# Patient Record
Sex: FEMALE | URBAN - METROPOLITAN AREA
[De-identification: names, ages, dates, MRNs, and addresses within clinical notes are randomized per-mention and may not be internally consistent; named-entity substitution may affect disease eponyms.]

---

## 2023-11-14 ENCOUNTER — APPOINTMENT (OUTPATIENT)
Dept: URBAN - METROPOLITAN AREA CLINIC 204 | Age: 38
Setting detail: DERMATOLOGY
End: 2023-11-15

## 2023-11-14 DIAGNOSIS — L82.1 OTHER SEBORRHEIC KERATOSIS: ICD-10-CM

## 2023-11-14 DIAGNOSIS — L57.8 OTHER SKIN CHANGES DUE TO CHRONIC EXPOSURE TO NONIONIZING RADIATION: ICD-10-CM

## 2023-11-14 PROCEDURE — OTHER ADDITIONAL NOTES: OTHER

## 2023-11-14 PROCEDURE — OTHER COUNSELING: OTHER

## 2023-11-14 PROCEDURE — OTHER MIPS QUALITY: OTHER

## 2023-11-14 PROCEDURE — 99203 OFFICE O/P NEW LOW 30 MIN: CPT

## 2023-11-14 ASSESSMENT — LOCATION DETAILED DESCRIPTION DERM
LOCATION DETAILED: LEFT INSTEP
LOCATION DETAILED: SUPERIOR MID FOREHEAD

## 2023-11-14 ASSESSMENT — LOCATION SIMPLE DESCRIPTION DERM
LOCATION SIMPLE: LEFT PLANTAR SURFACE
LOCATION SIMPLE: SUPERIOR FOREHEAD

## 2023-11-14 ASSESSMENT — LOCATION ZONE DERM
LOCATION ZONE: FEET
LOCATION ZONE: FACE

## 2023-11-14 NOTE — PROCEDURE: MIPS QUALITY
Quality 110: Preventive Care And Screening: Influenza Immunization: Influenza Immunization previously received during influenza season
Quality 47: Advance Care Plan: Advance Care Planning discussed and documented; advance care plan or surrogate decision maker documented in the medical record.
Quality 226: Preventive Care And Screening: Tobacco Use: Screening And Cessation Intervention: Patient screened for tobacco use, is a smoker AND received Cessation Counseling within measurement period or in the six months prior to the measurement period
Detail Level: Detailed
Quality 130: Documentation Of Current Medications In The Medical Record: Current Medications Documented

## 2023-11-14 NOTE — PROCEDURE: ADDITIONAL NOTES
Additional Notes: 0.7cm fleshy papule - if patient decides to have it removed in future.
Detail Level: Simple
Render Risk Assessment In Note?: no

## 2024-02-14 ENCOUNTER — OFFICE VISIT (OUTPATIENT)
Dept: URGENT CARE | Facility: CLINIC | Age: 39
End: 2024-02-14
Payer: MEDICAID

## 2024-02-14 VITALS
HEART RATE: 86 BPM | WEIGHT: 198 LBS | HEIGHT: 61 IN | DIASTOLIC BLOOD PRESSURE: 71 MMHG | SYSTOLIC BLOOD PRESSURE: 107 MMHG | RESPIRATION RATE: 16 BRPM | BODY MASS INDEX: 37.38 KG/M2 | TEMPERATURE: 97.5 F | OXYGEN SATURATION: 98 %

## 2024-02-14 DIAGNOSIS — K04.7 DENTAL ABSCESS: Primary | ICD-10-CM

## 2024-02-14 PROCEDURE — 99212 OFFICE O/P EST SF 10 MIN: CPT | Mod: 25

## 2024-02-14 RX ORDER — INDOMETHACIN 50 MG/1
50 CAPSULE ORAL
COMMUNITY

## 2024-02-14 RX ORDER — PRAZOSIN HYDROCHLORIDE 1 MG/1
1 CAPSULE ORAL
COMMUNITY

## 2024-02-14 RX ORDER — AMOXICILLIN AND CLAVULANATE POTASSIUM 875; 125 MG/1; MG/1
1 TABLET, FILM COATED ORAL 2 TIMES DAILY
Qty: 14 TABLET | Refills: 0 | Status: SHIPPED | OUTPATIENT
Start: 2024-02-14 | End: 2024-02-21

## 2024-02-14 RX ORDER — GABAPENTIN 300 MG/1
CAPSULE ORAL
COMMUNITY

## 2024-02-14 RX ORDER — BUSPIRONE HYDROCHLORIDE 10 MG/1
1 TABLET ORAL 2 TIMES DAILY
COMMUNITY
Start: 2021-02-09

## 2024-02-14 RX ORDER — DULOXETIN HYDROCHLORIDE 60 MG/1
CAPSULE, DELAYED RELEASE ORAL
COMMUNITY

## 2024-02-14 RX ORDER — CHLORHEXIDINE GLUCONATE ORAL RINSE 1.2 MG/ML
15 SOLUTION DENTAL AS NEEDED
Qty: 120 ML | Refills: 0 | Status: SHIPPED | OUTPATIENT
Start: 2024-02-14 | End: 2024-02-28

## 2024-02-14 RX ORDER — DOXEPIN HYDROCHLORIDE 25 MG/1
25 CAPSULE ORAL
COMMUNITY

## 2024-02-14 NOTE — PROGRESS NOTES
[facility]RG[facility] NOTE:      Name: Kam Guzman, 38 y.o.    CSN:[id number]   MRN:[id number]    PCP: Ora Kim, APRN-CNP    ALL:    Allergies   Allergen Reactions    Bupropion Hives, Shortness of breath and Swelling    Capsaicin Anaphylaxis    Hydrocodone-Cpm-Pseudoephed Other    Propranolol Other    Sumatriptan Other       History:    Chief Complaint: Dental Pain (Left sided jaw/dental pain and swelling x 2 days ) and Facial Swelling    Encounter Date: 2/14/2024      HPI: The history was obtained from the patient. Kam is a 38 y.o. female, who presents with a chief complaint of Dental Pain (Left sided jaw/dental pain and swelling x 2 days ) and Facial Swelling. She states she is to get dentures soon. She called her dentist but was not able to get her in. She states the pain is more so in her jaw rather than inside her mouth. Has swelling to L mandible with tenderness. Denies fevers, N/V, headache, trouble swallowing or breathing, chest pain, sob.     PMHx:    History reviewed. No pertinent past medical history.           Current Outpatient Medications   Medication Sig Dispense Refill    busPIRone (Buspar) 10 mg tablet Take 1 tablet (10 mg) by mouth 2 times a day.      doxepin (SINEquan) 25 mg capsule Take 1 capsule (25 mg) by mouth.      DULoxetine (Cymbalta) 60 mg DR capsule Take by mouth.      gabapentin (Neurontin) 300 mg capsule Take by mouth.      indomethacin (Indocin) 50 mg capsule Take 1 capsule (50 mg) by mouth 2 times a day with meals.      prazosin (Minipress) 1 mg capsule Take 1 capsule (1 mg) by mouth once daily.      amoxicillin-pot clavulanate (Augmentin) 875-125 mg tablet Take 1 tablet by mouth 2 times a day for 7 days. 14 tablet 0    chlorhexidine (Peridex) 0.12 % solution Use 15 mL in the mouth or throat if needed for wound care for up to 14 days. 120 mL 0     No current facility-administered medications for this visit.         PMSx:  History reviewed.  No pertinent surgical history.    Fam Hx: No family history on file.    SOC. Hx:     Social History     Socioeconomic History    Marital status:      Spouse name: Not on file    Number of children: Not on file    Years of education: Not on file    Highest education level: Not on file   Occupational History    Not on file   Tobacco Use    Smoking status: Former     Types: Cigarettes     Quit date: 2/11/2024    Smokeless tobacco: Never   Substance and Sexual Activity    Alcohol use: Not on file    Drug use: Not on file    Sexual activity: Not on file   Other Topics Concern    Not on file   Social History Narrative    Not on file     Social Determinants of Health     Financial Resource Strain: Not on file   Food Insecurity: Not on file   Transportation Needs: Not on file   Physical Activity: Not on file   Stress: Not on file   Social Connections: Not on file   Intimate Partner Violence: Not on file   Housing Stability: Not on file         Vitals:    02/14/24 0918   BP: 107/71   Pulse: 86   Resp: 16   Temp: 36.4 °C (97.5 °F)   SpO2: 98%     89.8 kg (198 lb)          Physical Exam  Constitutional:       Appearance: Normal appearance.   HENT:      Head:        Comments: Edema noted to L mandible. Tender. No erythema, ecchymosis present.      Right Ear: Tympanic membrane normal.      Left Ear: Tympanic membrane normal.      Nose: Nose normal.      Mouth/Throat:      Mouth: Mucous membranes are moist.      Dentition: Normal dentition.      Pharynx: Oropharynx is clear.        Comments: Notable poor dentition. #17-20 not present on L side. There is a tender abscess over buccal mucosa where teeth #17/18 would be present. No erythema.   Eyes:      Conjunctiva/sclera: Conjunctivae normal.      Pupils: Pupils are equal, round, and reactive to light.   Cardiovascular:      Rate and Rhythm: Normal rate and regular rhythm.   Pulmonary:      Effort: Pulmonary effort is normal.      Breath sounds: Normal breath sounds.    Skin:     General: Skin is warm.   Neurological:      General: No focal deficit present.      Mental Status: She is alert and oriented to person, place, and time.   Psychiatric:         Mood and Affect: Mood normal.         Behavior: Behavior normal.       LABORATORY @ RADIOLOGICAL IMAGING (if done):     No results found for this or any previous visit (from the past 24 hour(s)).     COURSE/MEDICAL DECISION MAKING:    Kam is a 38 y.o., who presents with a working diagnosis of   1. Dental abscess      Kam was seen today for dental pain and facial swelling.  Diagnoses and all orders for this visit:  Dental abscess (Primary)  -     amoxicillin-pot clavulanate (Augmentin) 875-125 mg tablet; Take 1 tablet by mouth 2 times a day for 7 days.  -     chlorhexidine (Peridex) 0.12 % solution; Use 15 mL in the mouth or throat if needed for wound care for up to 14 days.  Current plan is to treat with abx and peridex. Warm compresses may help. Tylenol for pain. She is to call her dentist today following her appointment at urgent care to discuss today's findings and be seen. She states she was going to call them immediately.     Discussed with patient if she has alarm symptoms such as but not limited to fevers, neck swelling, trouble swallowing/breathing, chest pain, sob, or overall worsening or no improvement in symptoms she will report immediately to the ER. Patient was agreeable to this plan.       Nara Christianson PA-C   Advanced Practice Provider  [facility]RG[facility]